# Patient Record
Sex: FEMALE | Race: WHITE | Employment: OTHER | URBAN - METROPOLITAN AREA
[De-identification: names, ages, dates, MRNs, and addresses within clinical notes are randomized per-mention and may not be internally consistent; named-entity substitution may affect disease eponyms.]

---

## 2017-06-13 ENCOUNTER — HOSPITAL ENCOUNTER (OUTPATIENT)
Age: 67
Setting detail: SPECIMEN
Discharge: HOME | End: 2017-06-13
Payer: MEDICARE

## 2017-06-13 LAB
ALBUMIN SERPL-MCNC: 4.1 G/DL (ref 3.5–5.2)
ALBUMIN/GLOBULIN RATIO: 1.1 (ref 1–2.5)
ALP BLD-CCNC: 42 U/L (ref 35–104)
ALT SERPL-CCNC: 78 U/L (ref 5–33)
ANION GAP SERPL CALCULATED.3IONS-SCNC: 16 MMOL/L (ref 9–17)
AST SERPL-CCNC: 60 U/L
BILIRUB SERPL-MCNC: 0.43 MG/DL (ref 0.3–1.2)
BUN BLDV-MCNC: 14 MG/DL (ref 8–23)
BUN/CREAT BLD: ABNORMAL (ref 9–20)
CALCIUM SERPL-MCNC: 9.5 MG/DL (ref 8.6–10.4)
CHLORIDE BLD-SCNC: 100 MMOL/L (ref 98–107)
CHOLESTEROL/HDL RATIO: 4.1
CHOLESTEROL: 186 MG/DL
CO2: 25 MMOL/L (ref 20–31)
CREAT SERPL-MCNC: 0.63 MG/DL (ref 0.5–0.9)
GFR AFRICAN AMERICAN: >60 ML/MIN
GFR NON-AFRICAN AMERICAN: >60 ML/MIN
GFR SERPL CREATININE-BSD FRML MDRD: ABNORMAL ML/MIN/{1.73_M2}
GFR SERPL CREATININE-BSD FRML MDRD: ABNORMAL ML/MIN/{1.73_M2}
GLUCOSE BLD-MCNC: 100 MG/DL (ref 70–99)
HCT VFR BLD CALC: 40.9 % (ref 36–46)
HDLC SERPL-MCNC: 45 MG/DL
HEMOGLOBIN: 13.6 G/DL (ref 12–16)
LDL CHOLESTEROL: 112 MG/DL (ref 0–130)
MCH RBC QN AUTO: 30.6 PG (ref 26–34)
MCHC RBC AUTO-ENTMCNC: 33.2 G/DL (ref 31–37)
MCV RBC AUTO: 92.1 FL (ref 80–100)
PDW BLD-RTO: 14.3 % (ref 12.5–15.4)
PLATELET # BLD: 217 K/UL (ref 140–450)
PMV BLD AUTO: 10 FL (ref 6–12)
POTASSIUM SERPL-SCNC: 4.8 MMOL/L (ref 3.7–5.3)
RBC # BLD: 4.44 M/UL (ref 4–5.2)
SODIUM BLD-SCNC: 141 MMOL/L (ref 135–144)
TOTAL PROTEIN: 7.9 G/DL (ref 6.4–8.3)
TRIGL SERPL-MCNC: 146 MG/DL
TSH SERPL DL<=0.05 MIU/L-ACNC: 1.53 MIU/L (ref 0.3–5)
VLDLC SERPL CALC-MCNC: NORMAL MG/DL (ref 1–30)
WBC # BLD: 5.1 K/UL (ref 3.5–11)

## 2017-08-10 ENCOUNTER — HOSPITAL ENCOUNTER (OUTPATIENT)
Age: 67
Setting detail: SPECIMEN
Discharge: HOME | End: 2017-08-10
Payer: MEDICARE

## 2017-08-10 LAB
ALBUMIN SERPL-MCNC: 4.2 G/DL (ref 3.5–5.2)
ALBUMIN/GLOBULIN RATIO: 1.1 (ref 1–2.5)
ALP BLD-CCNC: 44 U/L (ref 35–104)
ALT SERPL-CCNC: 52 U/L (ref 5–33)
AST SERPL-CCNC: 41 U/L
BILIRUB SERPL-MCNC: 0.4 MG/DL (ref 0.3–1.2)
BILIRUBIN DIRECT: 0.1 MG/DL
BILIRUBIN, INDIRECT: 0.3 MG/DL (ref 0–1)
GLOBULIN: ABNORMAL G/DL (ref 1.5–3.8)
TOTAL PROTEIN: 7.9 G/DL (ref 6.4–8.3)

## 2018-01-16 ENCOUNTER — HOSPITAL ENCOUNTER (OUTPATIENT)
Age: 68
Setting detail: SPECIMEN
Discharge: HOME | End: 2018-01-16
Payer: MEDICARE

## 2018-01-16 LAB
ALT SERPL-CCNC: 86 U/L (ref 5–33)
AST SERPL-CCNC: 64 U/L
CHOLESTEROL/HDL RATIO: 4.1
CHOLESTEROL: 156 MG/DL
HDLC SERPL-MCNC: 38 MG/DL
LDL CHOLESTEROL: 85 MG/DL (ref 0–130)
TRIGL SERPL-MCNC: 163 MG/DL
VLDLC SERPL CALC-MCNC: ABNORMAL MG/DL (ref 1–30)

## 2018-08-14 ENCOUNTER — HOSPITAL ENCOUNTER (OUTPATIENT)
Age: 68
Setting detail: SPECIMEN
Discharge: HOME | End: 2018-08-14
Payer: MEDICARE

## 2018-08-14 LAB
ALT SERPL-CCNC: 65 U/L (ref 5–33)
AST SERPL-CCNC: 44 U/L
BUN BLDV-MCNC: 11 MG/DL (ref 8–23)
CHOLESTEROL/HDL RATIO: 4.2
CHOLESTEROL: 187 MG/DL
CREAT SERPL-MCNC: 0.63 MG/DL (ref 0.5–0.9)
GFR AFRICAN AMERICAN: >60 ML/MIN
GFR NON-AFRICAN AMERICAN: >60 ML/MIN
GFR SERPL CREATININE-BSD FRML MDRD: NORMAL ML/MIN/{1.73_M2}
GFR SERPL CREATININE-BSD FRML MDRD: NORMAL ML/MIN/{1.73_M2}
HDLC SERPL-MCNC: 45 MG/DL
LDL CHOLESTEROL: 108 MG/DL (ref 0–130)
TRIGL SERPL-MCNC: 172 MG/DL
VLDLC SERPL CALC-MCNC: ABNORMAL MG/DL (ref 1–30)

## 2019-02-18 ENCOUNTER — HOSPITAL ENCOUNTER (OUTPATIENT)
Age: 69
Setting detail: SPECIMEN
Discharge: HOME | End: 2019-02-18
Payer: MEDICARE

## 2019-02-18 LAB
ALBUMIN SERPL-MCNC: 4 G/DL (ref 3.5–5.2)
ALBUMIN/GLOBULIN RATIO: 1.1 (ref 1–2.5)
ALP BLD-CCNC: 43 U/L (ref 35–104)
ALT SERPL-CCNC: 62 U/L (ref 5–33)
ANION GAP SERPL CALCULATED.3IONS-SCNC: 13 MMOL/L (ref 9–17)
AST SERPL-CCNC: 55 U/L
BILIRUB SERPL-MCNC: 0.45 MG/DL (ref 0.3–1.2)
BUN BLDV-MCNC: 11 MG/DL (ref 8–23)
BUN/CREAT BLD: ABNORMAL (ref 9–20)
CALCIUM SERPL-MCNC: 9.4 MG/DL (ref 8.6–10.4)
CHLORIDE BLD-SCNC: 102 MMOL/L (ref 98–107)
CHOLESTEROL/HDL RATIO: 4.8
CHOLESTEROL: 183 MG/DL
CO2: 27 MMOL/L (ref 20–31)
CREAT SERPL-MCNC: 0.56 MG/DL (ref 0.5–0.9)
GFR AFRICAN AMERICAN: >60 ML/MIN
GFR NON-AFRICAN AMERICAN: >60 ML/MIN
GFR SERPL CREATININE-BSD FRML MDRD: ABNORMAL ML/MIN/{1.73_M2}
GFR SERPL CREATININE-BSD FRML MDRD: ABNORMAL ML/MIN/{1.73_M2}
GLUCOSE BLD-MCNC: 95 MG/DL (ref 70–99)
HCT VFR BLD CALC: 41.7 % (ref 36.3–47.1)
HDLC SERPL-MCNC: 38 MG/DL
HEMOGLOBIN: 14.2 G/DL (ref 11.9–15.1)
LDL CHOLESTEROL: 109 MG/DL (ref 0–130)
MCH RBC QN AUTO: 32.3 PG (ref 25.2–33.5)
MCHC RBC AUTO-ENTMCNC: 34.1 G/DL (ref 28.4–34.8)
MCV RBC AUTO: 95 FL (ref 82.6–102.9)
NRBC AUTOMATED: 0 PER 100 WBC
PDW BLD-RTO: 12.2 % (ref 11.8–14.4)
PLATELET # BLD: 220 K/UL (ref 138–453)
PMV BLD AUTO: 11.4 FL (ref 8.1–13.5)
POTASSIUM SERPL-SCNC: 4.2 MMOL/L (ref 3.7–5.3)
RBC # BLD: 4.39 M/UL (ref 3.95–5.11)
SODIUM BLD-SCNC: 142 MMOL/L (ref 135–144)
TOTAL PROTEIN: 7.8 G/DL (ref 6.4–8.3)
TRIGL SERPL-MCNC: 182 MG/DL
TSH SERPL DL<=0.05 MIU/L-ACNC: 2.1 MIU/L (ref 0.3–5)
VLDLC SERPL CALC-MCNC: ABNORMAL MG/DL (ref 1–30)
WBC # BLD: 4.8 K/UL (ref 3.5–11.3)

## 2019-05-08 ENCOUNTER — HOSPITAL ENCOUNTER (OUTPATIENT)
Age: 69
Discharge: HOME | End: 2019-05-08
Payer: MEDICARE

## 2019-05-08 LAB
ALT SERPL-CCNC: 61 U/L (ref 5–33)
AST SERPL-CCNC: 54 U/L

## 2019-05-08 PROCEDURE — 84460 ALANINE AMINO (ALT) (SGPT): CPT

## 2019-05-08 PROCEDURE — 36415 COLL VENOUS BLD VENIPUNCTURE: CPT

## 2019-05-08 PROCEDURE — 84450 TRANSFERASE (AST) (SGOT): CPT

## 2019-09-23 ENCOUNTER — HOSPITAL ENCOUNTER (OUTPATIENT)
Age: 69
Setting detail: SPECIMEN
Discharge: HOME | End: 2019-09-23
Payer: MEDICARE

## 2019-09-23 LAB
ABSOLUTE EOS #: 0.14 K/UL (ref 0–0.44)
ABSOLUTE IMMATURE GRANULOCYTE: <0.03 K/UL (ref 0–0.3)
ABSOLUTE LYMPH #: 1.54 K/UL (ref 1.1–3.7)
ABSOLUTE MONO #: 0.31 K/UL (ref 0.1–1.2)
ALBUMIN SERPL-MCNC: 3.9 G/DL (ref 3.5–5.2)
ALBUMIN/GLOBULIN RATIO: 1 (ref 1–2.5)
ALP BLD-CCNC: 34 U/L (ref 35–104)
ALT SERPL-CCNC: 38 U/L (ref 5–33)
ANION GAP SERPL CALCULATED.3IONS-SCNC: 13 MMOL/L (ref 9–17)
AST SERPL-CCNC: 32 U/L
BASOPHILS # BLD: 1 % (ref 0–2)
BASOPHILS ABSOLUTE: 0.03 K/UL (ref 0–0.2)
BILIRUB SERPL-MCNC: 0.46 MG/DL (ref 0.3–1.2)
BUN BLDV-MCNC: 12 MG/DL (ref 8–23)
BUN/CREAT BLD: ABNORMAL (ref 9–20)
CALCIUM SERPL-MCNC: 9.1 MG/DL (ref 8.6–10.4)
CHLORIDE BLD-SCNC: 104 MMOL/L (ref 98–107)
CHOLESTEROL/HDL RATIO: 5.1
CHOLESTEROL: 203 MG/DL
CO2: 26 MMOL/L (ref 20–31)
CREAT SERPL-MCNC: 0.53 MG/DL (ref 0.5–0.9)
DIFFERENTIAL TYPE: NORMAL
EOSINOPHILS RELATIVE PERCENT: 3 % (ref 1–4)
GFR AFRICAN AMERICAN: >60 ML/MIN
GFR NON-AFRICAN AMERICAN: >60 ML/MIN
GFR SERPL CREATININE-BSD FRML MDRD: ABNORMAL ML/MIN/{1.73_M2}
GFR SERPL CREATININE-BSD FRML MDRD: ABNORMAL ML/MIN/{1.73_M2}
GLUCOSE BLD-MCNC: 109 MG/DL (ref 70–99)
HCT VFR BLD CALC: 41.7 % (ref 36.3–47.1)
HDLC SERPL-MCNC: 40 MG/DL
HEMOGLOBIN: 13 G/DL (ref 11.9–15.1)
IMMATURE GRANULOCYTES: 0 %
LDL CHOLESTEROL: 127 MG/DL (ref 0–130)
LYMPHOCYTES # BLD: 34 % (ref 24–43)
MCH RBC QN AUTO: 29.7 PG (ref 25.2–33.5)
MCHC RBC AUTO-ENTMCNC: 31.2 G/DL (ref 28.4–34.8)
MCV RBC AUTO: 95.4 FL (ref 82.6–102.9)
MONOCYTES # BLD: 7 % (ref 3–12)
NRBC AUTOMATED: 0 PER 100 WBC
PDW BLD-RTO: 12.2 % (ref 11.8–14.4)
PLATELET # BLD: 202 K/UL (ref 138–453)
PLATELET ESTIMATE: NORMAL
PMV BLD AUTO: 11.4 FL (ref 8.1–13.5)
POTASSIUM SERPL-SCNC: 4.2 MMOL/L (ref 3.7–5.3)
RBC # BLD: 4.37 M/UL (ref 3.95–5.11)
RBC # BLD: NORMAL 10*6/UL
SEG NEUTROPHILS: 55 % (ref 36–65)
SEGMENTED NEUTROPHILS ABSOLUTE COUNT: 2.48 K/UL (ref 1.5–8.1)
SODIUM BLD-SCNC: 143 MMOL/L (ref 135–144)
THYROXINE, FREE: 0.85 NG/DL (ref 0.93–1.7)
TOTAL PROTEIN: 7.7 G/DL (ref 6.4–8.3)
TRIGL SERPL-MCNC: 178 MG/DL
TSH SERPL DL<=0.05 MIU/L-ACNC: 1.37 MIU/L (ref 0.3–5)
VITAMIN D 25-HYDROXY: 39.1 NG/ML (ref 30–100)
VLDLC SERPL CALC-MCNC: ABNORMAL MG/DL (ref 1–30)
WBC # BLD: 4.5 K/UL (ref 3.5–11.3)
WBC # BLD: NORMAL 10*3/UL

## 2019-09-23 PROCEDURE — 36415 COLL VENOUS BLD VENIPUNCTURE: CPT

## 2019-09-23 PROCEDURE — 84439 ASSAY OF FREE THYROXINE: CPT

## 2019-09-23 PROCEDURE — 80053 COMPREHEN METABOLIC PANEL: CPT

## 2019-09-23 PROCEDURE — 84443 ASSAY THYROID STIM HORMONE: CPT

## 2019-09-23 PROCEDURE — 82306 VITAMIN D 25 HYDROXY: CPT

## 2019-09-23 PROCEDURE — 85025 COMPLETE CBC W/AUTO DIFF WBC: CPT

## 2019-09-23 PROCEDURE — 80061 LIPID PANEL: CPT

## 2020-06-12 ENCOUNTER — HOSPITAL ENCOUNTER (OUTPATIENT)
Age: 70
Setting detail: SPECIMEN
Discharge: HOME | End: 2020-06-12
Payer: MEDICARE

## 2020-06-12 LAB
ALBUMIN SERPL-MCNC: 4 G/DL (ref 3.5–5.2)
ALBUMIN/GLOBULIN RATIO: 1.1 (ref 1–2.5)
ALP BLD-CCNC: 38 U/L (ref 35–104)
ALT SERPL-CCNC: 28 U/L (ref 5–33)
ANION GAP SERPL CALCULATED.3IONS-SCNC: 12 MMOL/L (ref 9–17)
AST SERPL-CCNC: 24 U/L
BILIRUB SERPL-MCNC: 0.53 MG/DL (ref 0.3–1.2)
BUN BLDV-MCNC: 17 MG/DL (ref 8–23)
BUN/CREAT BLD: NORMAL (ref 9–20)
CALCIUM SERPL-MCNC: 9 MG/DL (ref 8.6–10.4)
CHLORIDE BLD-SCNC: 102 MMOL/L (ref 98–107)
CHOLESTEROL, FASTING: 172 MG/DL
CHOLESTEROL/HDL RATIO: 4.5
CO2: 27 MMOL/L (ref 20–31)
CREAT SERPL-MCNC: 0.54 MG/DL (ref 0.5–0.9)
GFR AFRICAN AMERICAN: >60 ML/MIN
GFR NON-AFRICAN AMERICAN: >60 ML/MIN
GFR SERPL CREATININE-BSD FRML MDRD: NORMAL ML/MIN/{1.73_M2}
GFR SERPL CREATININE-BSD FRML MDRD: NORMAL ML/MIN/{1.73_M2}
GLUCOSE FASTING: 99 MG/DL (ref 70–99)
HDLC SERPL-MCNC: 38 MG/DL
LDL CHOLESTEROL: 97 MG/DL (ref 0–130)
POTASSIUM SERPL-SCNC: 4.6 MMOL/L (ref 3.7–5.3)
SODIUM BLD-SCNC: 141 MMOL/L (ref 135–144)
TOTAL PROTEIN: 7.6 G/DL (ref 6.4–8.3)
TRIGLYCERIDE, FASTING: 187 MG/DL
VLDLC SERPL CALC-MCNC: ABNORMAL MG/DL (ref 1–30)

## 2020-06-12 PROCEDURE — 80061 LIPID PANEL: CPT

## 2020-06-12 PROCEDURE — 80053 COMPREHEN METABOLIC PANEL: CPT

## 2023-02-14 PROBLEM — E66.9 OBESITY WITH BODY MASS INDEX 30 OR GREATER: Status: ACTIVE | Noted: 2023-02-14

## 2025-06-03 ENCOUNTER — TELEPHONE (OUTPATIENT)
Dept: SURGERY | Age: 75
End: 2025-06-03

## 2025-06-03 NOTE — TELEPHONE ENCOUNTER
Received call from patient requesting to schedule colonoscopy w/ Dr. Gonzalez. Previous [facility] pt - no concerns at this time. Stated last colonoscopy was about 5 years ago --- stated she is on the 5 year plan.    Demographics & insurance info updated.    Please advise.    Call back #: [phone]

## 2025-06-03 NOTE — TELEPHONE ENCOUNTER
[facility] General Surgery  Screening colonoscopy questionnaire  Anagini Carrera    Pt Name: Lacie Gill  MRN: [id number]  YOB: 1950  Primary Care Physician: Jw Flores, DO      Have you ever had a colonoscopy? yes  When was your last colonoscopy? 5/2016 and 6/2020  with dr eden  Have you ever had polyps or abnormal findings on past colonoscopies polyp noted on 2016 colo  Which bowel prep did you use? Lime flavored prep Were there any side effects from it? no    Have you ever had a stool test to check for colon cancer? No  Was it positive?       Do you have personal history of colon cancer? No    Do you have any family history of colon cancer? Yes  If yes, which family member had colon cancer? Maternal gma   Were they diagnosed younger or older than the age of 60?  unsure    Do you have a history of Crohn's disease or Ulcerative Colitis? No    Do you have a history of constipation?  Bouts of diarrhea    Do you have a history of bloody or black stools? No    Have you ever had surgery done inside your abdomen? No  What surgery?    8. Are you taking any blood thinners? No   If yes, what is the name of the blood thinner?     9. Are you taking any GLP-1 medications or Diabetic meds ?Yes   If yes, what is the name of the GLP-1?  metformin      Scheduling:    Okay to schedule colonoscopy.  Reason for colonoscopy: Personal history of colon polyps.

## 2025-06-16 ENCOUNTER — PREP FOR PROCEDURE (OUTPATIENT)
Dept: SURGERY | Age: 75
End: 2025-06-16

## 2025-06-16 ENCOUNTER — TELEPHONE (OUTPATIENT)
Dept: SURGERY | Age: 75
End: 2025-06-16

## 2025-06-16 DIAGNOSIS — Z86.0100 HX OF COLONIC POLYPS: ICD-10-CM

## 2025-06-16 DIAGNOSIS — Z86.0100 HX OF COLONIC POLYPS: Primary | ICD-10-CM

## 2025-06-16 RX ORDER — SOD SULF/POT CHLORIDE/MAG SULF 1.479 G
TABLET ORAL
Qty: 24 TABLET | Refills: 0 | Status: SHIPPED | OUTPATIENT
Start: 2025-06-16

## 2025-06-23 ENCOUNTER — TELEPHONE (OUTPATIENT)
Dept: SURGERY | Age: 75
End: 2025-06-23

## 2025-06-23 NOTE — TELEPHONE ENCOUNTER
6/23/2025 Left voicemail for patient to call the office and go over the instructions below.  06/23/2025 Spoke with patient.    Procedure confirmation instructions    - Arrival time: 7:15 am  - Scheduled time: 8:45 am  - Location: [facility], Entrance C check in with registration right when you walk in.    - Do you have a  that will stay at the hospital, drive you home and will be with you after the procedure? Yes  - Any illness is the last 7 days: No  -call the office ASAP if you are ill prior to your colonoscopy    - Blood Thinners: NA                Name: NA                 Last scheduled dose: NA  - GLP-1/Weight loss: No    Name: N/A   Last scheduled dose: NA   Prep for GLP-1: N/A    - Prep:  Sutab            - Take the prep as directed by the office not on what the label says    - Clear liquid diet the day prior to the appointment- No solid food, dairy, no corn, nothing red, nothing purple.  - Nothing by mouth after midnight, including smoking and vaping.  - The morning of the procedure only take your blood pressure, heart and seizure medication with a small sip of water only.  - No NSAID, ASA, vitamin or herbal supplements 7 days prior.  - Come in comfortable clothes, no make up, nail polish, lotions, leave contacts at home or bring in the case, no jewelry  - Use antibacterial soap (dial soap or Hibiclens) the night before or morning of.      Patient confirms they have picked up their bowel prep from the pharmacy.  Patient confirms they have their instructions to reference.   Patient voiced understanding all the above.

## 2025-06-30 ENCOUNTER — ANESTHESIA EVENT (OUTPATIENT)
Dept: OPERATING ROOM | Age: 75
End: 2025-06-30
Payer: MEDICARE

## 2025-06-30 PROCEDURE — NBSRV NON-BILLABLE SERVICE: Performed by: COLON & RECTAL SURGERY

## 2025-06-30 ASSESSMENT — ENCOUNTER SYMPTOMS
ANAL BLEEDING: 0
CHEST TIGHTNESS: 0
DIARRHEA: 0
CONSTIPATION: 0
COLOR CHANGE: 0
WHEEZING: 0
SHORTNESS OF BREATH: 0
BACK PAIN: 0
ABDOMINAL PAIN: 0
VOMITING: 0
ABDOMINAL DISTENTION: 0
RECTAL PAIN: 0
NAUSEA: 0
STRIDOR: 0
BLOOD IN STOOL: 0
APNEA: 0
COUGH: 0

## 2025-06-30 NOTE — H&P
[facility] [place]  [address]  [facility] [address]  Dept: [phone]  Loc: [phone]    Patient:  Lacie Gill  YOB: 1950  Date: 6/30/2025     The patient is a 75 y.o. female who presents today for consult of the following problems:     Chief Complaint: Colonoscopy needed for polyp surveillance.    HPI:   This 75-year-old lady presents with a need for colonoscopy for polyp surveillance.  Her last colonoscopy was in 2020.  Patient had a polyp removed in 2016.  Currently the patient denies any GI related symptoms.  History:     Past Medical History:   Diagnosis Date    Depression     Hypertension     Shortness of breath      Past Surgical History:   Procedure Laterality Date    FRACTURE SURGERY  2014    HYSTERECTOMY (CERVIX STATUS UNKNOWN)       No family history on file.  Social History     Tobacco Use    Smoking status: Never   Substance Use Topics    Alcohol use: Yes     Alcohol/week: 0.0 standard drinks of alcohol     Comment: 1 drink per week    Drug use: No     No current facility-administered medications for this encounter.     Current Outpatient Medications   Medication Sig Dispense Refill    Sodium Sulfate-Mag Sulfate-KCl (SUTAB) 0749-484-185 MG TABS Please take as directed by office for colonoscopy 24 tablet 0    fluorouracil (EFUDEX) 5 % cream Apply topically 2 times daily. 40 g 0    calcium carbonate (OSCAL) 500 MG TABS tablet Take 500 mg by mouth daily      aspirin 81 MG tablet Take 81 mg by mouth daily      escitalopram (LEXAPRO) 10 MG tablet Take 10 mg by mouth daily      lisinopril (PRINIVIL;ZESTRIL) 40 MG tablet Take 40 mg by mouth daily       Prior to Admission medications    Medication Sig Start Date End Date Taking? Authorizing Provider   Sodium Sulfate-Mag Sulfate-KCl (SUTAB) 0960-738-008 MG TABS Please take as directed by office for colonoscopy 6/16/25   Romulo Gonzalez MD   fluorouracil (EFUDEX) 5 % cream Apply topically 2

## 2025-07-01 ENCOUNTER — HOSPITAL ENCOUNTER (OUTPATIENT)
Age: 75
Setting detail: OUTPATIENT SURGERY
Discharge: HOME | End: 2025-07-01
Attending: COLON & RECTAL SURGERY | Admitting: COLON & RECTAL SURGERY
Payer: MEDICARE

## 2025-07-01 ENCOUNTER — TELEPHONE (OUTPATIENT)
Dept: SURGERY | Age: 75
End: 2025-07-01

## 2025-07-01 ENCOUNTER — TELEPHONE (OUTPATIENT)
Age: 75
End: 2025-07-01

## 2025-07-01 ENCOUNTER — ANESTHESIA (OUTPATIENT)
Dept: OPERATING ROOM | Age: 75
End: 2025-07-01
Payer: MEDICARE

## 2025-07-01 VITALS
TEMPERATURE: 97.5 F | RESPIRATION RATE: 14 BRPM | BODY MASS INDEX: 34.73 KG/M2 | WEIGHT: 196 LBS | HEART RATE: 51 BPM | DIASTOLIC BLOOD PRESSURE: 71 MMHG | OXYGEN SATURATION: 97 % | SYSTOLIC BLOOD PRESSURE: 126 MMHG | HEIGHT: 63 IN

## 2025-07-01 DIAGNOSIS — Z86.0100 HX OF COLONIC POLYPS: Primary | ICD-10-CM

## 2025-07-01 DIAGNOSIS — Z86.0100 HX OF COLONIC POLYPS: ICD-10-CM

## 2025-07-01 PROCEDURE — 7100000011 HC PHASE II RECOVERY - ADDTL 15 MIN: Performed by: COLON & RECTAL SURGERY

## 2025-07-01 PROCEDURE — 45385 COLONOSCOPY W/LESION REMOVAL: CPT | Performed by: COLON & RECTAL SURGERY

## 2025-07-01 PROCEDURE — 3609010600 HC COLONOSCOPY POLYPECTOMY SNARE/COLD BIOPSY: Performed by: COLON & RECTAL SURGERY

## 2025-07-01 PROCEDURE — 2709999900 HC NON-CHARGEABLE SUPPLY: Performed by: COLON & RECTAL SURGERY

## 2025-07-01 PROCEDURE — 7100000010 HC PHASE II RECOVERY - FIRST 15 MIN: Performed by: COLON & RECTAL SURGERY

## 2025-07-01 PROCEDURE — 2580000003 HC RX 258: Performed by: STUDENT IN AN ORGANIZED HEALTH CARE EDUCATION/TRAINING PROGRAM

## 2025-07-01 PROCEDURE — 3700000001 HC ADD 15 MINUTES (ANESTHESIA): Performed by: COLON & RECTAL SURGERY

## 2025-07-01 PROCEDURE — 3700000000 HC ANESTHESIA ATTENDED CARE: Performed by: COLON & RECTAL SURGERY

## 2025-07-01 PROCEDURE — 45380 COLONOSCOPY AND BIOPSY: CPT | Performed by: COLON & RECTAL SURGERY

## 2025-07-01 PROCEDURE — 6360000002 HC RX W HCPCS: Performed by: NURSE ANESTHETIST, CERTIFIED REGISTERED

## 2025-07-01 PROCEDURE — 88305 TISSUE EXAM BY PATHOLOGIST: CPT

## 2025-07-01 RX ORDER — IPRATROPIUM BROMIDE AND ALBUTEROL SULFATE 2.5; .5 MG/3ML; MG/3ML
1 SOLUTION RESPIRATORY (INHALATION)
Status: DISCONTINUED | OUTPATIENT
Start: 2025-07-01 | End: 2025-07-01 | Stop reason: HOSPADM

## 2025-07-01 RX ORDER — LIDOCAINE HYDROCHLORIDE 10 MG/ML
1 INJECTION, SOLUTION EPIDURAL; INFILTRATION; INTRACAUDAL; PERINEURAL
Status: DISCONTINUED | OUTPATIENT
Start: 2025-07-01 | End: 2025-07-01 | Stop reason: HOSPADM

## 2025-07-01 RX ORDER — OXYCODONE AND ACETAMINOPHEN 5; 325 MG/1; MG/1
2 TABLET ORAL
Status: DISCONTINUED | OUTPATIENT
Start: 2025-07-01 | End: 2025-07-01 | Stop reason: HOSPADM

## 2025-07-01 RX ORDER — SODIUM CHLORIDE 0.9 % (FLUSH) 0.9 %
5-40 SYRINGE (ML) INJECTION PRN
Status: DISCONTINUED | OUTPATIENT
Start: 2025-07-01 | End: 2025-07-01 | Stop reason: HOSPADM

## 2025-07-01 RX ORDER — PHENOL 1.4 %
1 AEROSOL, SPRAY (ML) MUCOUS MEMBRANE DAILY
COMMUNITY

## 2025-07-01 RX ORDER — LABETALOL HYDROCHLORIDE 5 MG/ML
10 INJECTION, SOLUTION INTRAVENOUS
Status: DISCONTINUED | OUTPATIENT
Start: 2025-07-01 | End: 2025-07-01 | Stop reason: HOSPADM

## 2025-07-01 RX ORDER — NALOXONE HYDROCHLORIDE 0.4 MG/ML
INJECTION, SOLUTION INTRAMUSCULAR; INTRAVENOUS; SUBCUTANEOUS PRN
Status: DISCONTINUED | OUTPATIENT
Start: 2025-07-01 | End: 2025-07-01 | Stop reason: HOSPADM

## 2025-07-01 RX ORDER — ROSUVASTATIN CALCIUM 5 MG/1
5 TABLET, COATED ORAL DAILY
COMMUNITY

## 2025-07-01 RX ORDER — LIDOCAINE HYDROCHLORIDE 10 MG/ML
INJECTION, SOLUTION EPIDURAL; INFILTRATION; INTRACAUDAL; PERINEURAL
Status: DISCONTINUED | OUTPATIENT
Start: 2025-07-01 | End: 2025-07-01 | Stop reason: SDUPTHER

## 2025-07-01 RX ORDER — SODIUM CHLORIDE, SODIUM LACTATE, POTASSIUM CHLORIDE, CALCIUM CHLORIDE 600; 310; 30; 20 MG/100ML; MG/100ML; MG/100ML; MG/100ML
INJECTION, SOLUTION INTRAVENOUS CONTINUOUS
Status: DISCONTINUED | OUTPATIENT
Start: 2025-07-01 | End: 2025-07-01 | Stop reason: HOSPADM

## 2025-07-01 RX ORDER — GLYCOPYRROLATE 0.2 MG/ML
0.4 INJECTION INTRAMUSCULAR; INTRAVENOUS ONCE
Status: DISCONTINUED | OUTPATIENT
Start: 2025-07-01 | End: 2025-07-01 | Stop reason: HOSPADM

## 2025-07-01 RX ORDER — PROPOFOL 10 MG/ML
INJECTION, EMULSION INTRAVENOUS
Status: DISCONTINUED | OUTPATIENT
Start: 2025-07-01 | End: 2025-07-01 | Stop reason: SDUPTHER

## 2025-07-01 RX ORDER — HYDRALAZINE HYDROCHLORIDE 20 MG/ML
10 INJECTION INTRAMUSCULAR; INTRAVENOUS
Status: DISCONTINUED | OUTPATIENT
Start: 2025-07-01 | End: 2025-07-01 | Stop reason: HOSPADM

## 2025-07-01 RX ORDER — SODIUM CHLORIDE 0.9 % (FLUSH) 0.9 %
5-40 SYRINGE (ML) INJECTION EVERY 12 HOURS SCHEDULED
Status: DISCONTINUED | OUTPATIENT
Start: 2025-07-01 | End: 2025-07-01 | Stop reason: HOSPADM

## 2025-07-01 RX ORDER — SODIUM CHLORIDE 9 MG/ML
INJECTION, SOLUTION INTRAVENOUS PRN
Status: DISCONTINUED | OUTPATIENT
Start: 2025-07-01 | End: 2025-07-01 | Stop reason: HOSPADM

## 2025-07-01 RX ORDER — OXYCODONE AND ACETAMINOPHEN 5; 325 MG/1; MG/1
1 TABLET ORAL
Status: DISCONTINUED | OUTPATIENT
Start: 2025-07-01 | End: 2025-07-01 | Stop reason: HOSPADM

## 2025-07-01 RX ORDER — MEPERIDINE HYDROCHLORIDE 50 MG/ML
12.5 INJECTION INTRAMUSCULAR; INTRAVENOUS; SUBCUTANEOUS EVERY 5 MIN PRN
Status: DISCONTINUED | OUTPATIENT
Start: 2025-07-01 | End: 2025-07-01 | Stop reason: HOSPADM

## 2025-07-01 RX ORDER — MORPHINE SULFATE 2 MG/ML
2 INJECTION, SOLUTION INTRAMUSCULAR; INTRAVENOUS EVERY 5 MIN PRN
Status: DISCONTINUED | OUTPATIENT
Start: 2025-07-01 | End: 2025-07-01 | Stop reason: HOSPADM

## 2025-07-01 RX ORDER — METOCLOPRAMIDE HYDROCHLORIDE 5 MG/ML
10 INJECTION INTRAMUSCULAR; INTRAVENOUS
Status: DISCONTINUED | OUTPATIENT
Start: 2025-07-01 | End: 2025-07-01 | Stop reason: HOSPADM

## 2025-07-01 RX ORDER — MIDAZOLAM HYDROCHLORIDE 2 MG/2ML
2 INJECTION, SOLUTION INTRAMUSCULAR; INTRAVENOUS
Status: DISCONTINUED | OUTPATIENT
Start: 2025-07-01 | End: 2025-07-01 | Stop reason: HOSPADM

## 2025-07-01 RX ORDER — ONDANSETRON 2 MG/ML
4 INJECTION INTRAMUSCULAR; INTRAVENOUS
Status: DISCONTINUED | OUTPATIENT
Start: 2025-07-01 | End: 2025-07-01 | Stop reason: HOSPADM

## 2025-07-01 RX ORDER — DIPHENHYDRAMINE HYDROCHLORIDE 50 MG/ML
12.5 INJECTION, SOLUTION INTRAMUSCULAR; INTRAVENOUS
Status: DISCONTINUED | OUTPATIENT
Start: 2025-07-01 | End: 2025-07-01 | Stop reason: HOSPADM

## 2025-07-01 RX ORDER — IRBESARTAN 300 MG/1
300 TABLET ORAL
COMMUNITY

## 2025-07-01 RX ADMIN — PROPOFOL 50 MG: 10 INJECTION, EMULSION INTRAVENOUS at 08:46

## 2025-07-01 RX ADMIN — PROPOFOL 140 MCG/KG/MIN: 10 INJECTION, EMULSION INTRAVENOUS at 08:46

## 2025-07-01 RX ADMIN — SODIUM CHLORIDE, POTASSIUM CHLORIDE, SODIUM LACTATE AND CALCIUM CHLORIDE: 600; 310; 30; 20 INJECTION, SOLUTION INTRAVENOUS at 08:39

## 2025-07-01 RX ADMIN — LIDOCAINE HYDROCHLORIDE 50 MG: 10 INJECTION, SOLUTION EPIDURAL; INFILTRATION; INTRACAUDAL; PERINEURAL at 08:46

## 2025-07-01 ASSESSMENT — PAIN - FUNCTIONAL ASSESSMENT: PAIN_FUNCTIONAL_ASSESSMENT: NONE - DENIES PAIN

## 2025-07-01 NOTE — ANESTHESIA PRE PROCEDURE
Department of Anesthesiology  Preprocedure Note       Name:  Lacie Gill   Age:  75 y.o.  :  1950                                          MRN:  [id number]         Date:  2025      Surgeon: Surgeon(s):  Romulo Gonzalez MD    Procedure: Procedure(s):  COLONOSCOPY DIAGNOSTIC    Medications prior to admission:   Prior to Admission medications    Medication Sig Start Date End Date Taking? Authorizing Provider   irbesartan (AVAPRO) 300 MG tablet Take 1 tablet by mouth every morning (before breakfast)   Yes Bo Fay MD   calcium carbonate 600 MG TABS tablet Take 1 tablet by mouth daily   Yes Bo Fay MD   metFORMIN (GLUCOPHAGE) 500 MG tablet Take 1 tablet by mouth 2 times daily (with meals)   Yes Bo Fay MD   rosuvastatin (CRESTOR) 5 MG tablet Take 1 tablet by mouth daily   Yes Bo Fay MD   Sodium Sulfate-Mag Sulfate-KCl (SUTAB) 4738-024-987 MG TABS Please take as directed by office for colonoscopy 25  Yes Romulo Gonzalez MD   escitalopram (LEXAPRO) 10 MG tablet Take 1 tablet by mouth daily   Yes Bo Fay MD   fluorouracil (EFUDEX) 5 % cream Apply topically 2 times daily.  Patient not taking: Reported on 2025   Bernadine Gates, CORNELL - CNP   calcium carbonate (OSCAL) 500 MG TABS tablet Take 500 mg by mouth daily  Patient not taking: Reported on 2025    Bo Fay MD   aspirin 81 MG tablet Take 81 mg by mouth daily  Patient not taking: Reported on 2025    Bo Fay MD   lisinopril (PRINIVIL;ZESTRIL) 40 MG tablet Take 40 mg by mouth daily  Patient not taking: Reported on 2025    Bo Fay MD       Current medications:    Current Facility-Administered Medications   Medication Dose Route Frequency Provider Last Rate Last Admin   • lidocaine PF 1 % injection 1 mL  1 mL IntraDERmal Once PRN Corrie Herbert MD       • lactated ringers infusion   IntraVENous Continuous Corrie Herbert

## 2025-07-01 NOTE — DISCHARGE INSTRUCTIONS
Colonoscopy / Sigmoidoscopy Discharge Instructions  Romulo Gonzalez M.D.    You may have a regular diet unless instructed otherwise and if you do not have any abdominal (belly) pains, nausea, vomiting or fever.    No driving, operating machinery or making important decisions for the next 24 hours.    It is normal to feel distended or full in the abdomen; passing gas will help relieve the fullness.  The fullness is from the air put inside your bowel (colon) during the procedure.    It is normal to pass small amounts of blood after the colonoscopy.    Findings today included:  Small polyp completely removed and sent to pathology  Fatty tissue which was biopsied.  Large broad-based polyp which was left in place as removal with standard colonoscopy techniques with the wrist bowel perforation.  Referral will be made to Dr. Buckner whose office will call you and arrangements will be made to remove it by special technique known as Endo mucosal resection.    Medications:   Changes to home medication therapy: See below  Hold Aspirin and related pain meds (Ibuprofen, Naproxen, etc) except Acetaminophen  for 7 days.    We will call you within a week with results of pathology tests if any tissue was removed.    Schedule a follow up visit as needed.    Below are abnormal symptoms which will need medical attention; please call the office at any time if these occur.  Large amount of bleeding through the rectum   Severe abdominal pain or pain in the belly (it is normal to have occasional mild discomfort).  Fever of 101F or above, chills or excessive sweating.  Persistent nausea or vomiting.

## 2025-07-01 NOTE — TELEPHONE ENCOUNTER
Per Staff order after 07/01 colonoscopy:    Romulo Gonzalez MD Vargyas, Chelsea, ROOPA; Ricardo Sandoval MA  Please refer this patient to Dr. Buckner for removal of broad-based polyp at 30 cm by EMR technique.  Patient should be on media.  Thank you.  Lisa.

## 2025-07-01 NOTE — ANESTHESIA POSTPROCEDURE EVALUATION
Department of Anesthesiology  Postprocedure Note    Patient: Lacie Gill  MRN: [id number]  YOB: 1950  Date of evaluation: 7/1/2025    Procedure Summary       Date: 07/01/25 Room / Location: [facility] PROCEDURE ROOM / [facility]    Anesthesia Start: 0842 Anesthesia Stop: 0923    Procedure: COLONOSCOPY POLYPECTOMY COLD SNARE AND LIPOMA BIOPSY (Anus) Diagnosis:       Hx of colonic polyps      (Hx of colonic polyps [Z86.0100])    Surgeons: Romulo Gonzalez MD Responsible Provider: Joseph Keller MD    Anesthesia Type: MAC ASA Status: 2            Anesthesia Type: No value filed.    Domi Phase I: Domi Score: 10    Domi Phase II: Domi Score: 10    Anesthesia Post Evaluation    Patient location during evaluation: PACU  Patient participation: complete - patient participated  Level of consciousness: awake and alert  Airway patency: patent  Nausea & Vomiting: no nausea and no vomiting  Cardiovascular status: hemodynamically stable  Respiratory status: room air and spontaneous ventilation  Hydration status: euvolemic  Multimodal analgesia pain management approach  Pain management: adequate    No notable events documented.

## 2025-07-01 NOTE — OP NOTE
Colonoscopy Operative Report    Pre-operative Diagnosis: Personal history of colon/ rectal polyp    Post-operative Diagnosis: Colonic polyps, and lipoma.    Procedure: Colonoscopy with snare polypectomy and Colonoscopy with biopsy    Surgeon: BERNARDO Gonzalez  Assistant: RN/ Technician    IV Medications: Monitored anesthesia administered by anesthesiologist    Complications: None    Estimated blood loss: None    Bowel Prep Quality:    []  Good    []  Some fecal material but acceptable    []  Unacceptable      Findings:       [x]  Polyps    #1,  8 mm in size, sessile, located at 100 cm from anal verge removed by cold snare and retrieved for pathology  #2,  50 mm in size, sessile on a broad pedicle located at 30 cm from anal verge.  This was left in place due to the risk of bowel perforation by standard techniques.  Referral will be made for removal by EMR technique.     [x] lipoma noted in ascending colon which demonstrated below side.  Biopsy taken from its surface to exclude dysplasia.    Specimens:   ID Type Source Tests Collected by Time Destination   A : lipoma biopsy surface ascending colon Tissue Tissue SURGICAL PATHOLOGY Romulo Gonzalez MD 7/1/2025 0858    B : Polyp at 100 CM Tissue Colon SURGICAL PATHOLOGY Romulo Gonzalez MD 7/1/2025 0908        Implants:  * No implants in log *      Drains:  * No LDAs found *    Details: The patient was placed in left lateral position on the operating table. After the initiation of intravenous sedatives by the anesthesiologist, digital rectal exam was done, and a flexible pediatric colonoscope of variable stiffness was inserted per rectum and advanced all the way to cecum under direct vision.    The cecum was recognized by the ileocecal valve and appendiceal orifice.    The colonoscope was withdrawn, and the ascending, transverse, descending, and sigmoid segments of the colon as well as the rectum were carefully examined.   The tip of the colonoscope was retroflexed in the

## 2025-07-02 ENCOUNTER — RESULTS FOLLOW-UP (OUTPATIENT)
Dept: SURGERY | Age: 75
End: 2025-07-02

## 2025-07-02 LAB — SURGICAL PATHOLOGY REPORT: NORMAL

## 2025-07-07 ENCOUNTER — PREP FOR PROCEDURE (OUTPATIENT)
Dept: GASTROENTEROLOGY | Age: 75
End: 2025-07-07

## 2025-07-07 ENCOUNTER — TELEPHONE (OUTPATIENT)
Dept: GASTROENTEROLOGY | Age: 75
End: 2025-07-07

## 2025-07-07 PROBLEM — K63.5 COLON POLYP: Status: ACTIVE | Noted: 2025-07-07

## 2025-07-07 NOTE — TELEPHONE ENCOUNTER
Procedure scheduled/Sita  Procedure: ColonEMR  Dx:Colon polyp  Referring: Lisa  Date: 08/19/25  Time: 11:00am/arrive 9:30am  Hospital: [facility]  Bowel Prep: SuTab (per patient request)  Patient advised by phone: phone/mailed bowel prep instructions/patient instructed to get SuTab coupon online.  Clearance: none  GLP-1: none

## 2025-08-15 RX ORDER — LANOLIN ALCOHOL/MO/W.PET/CERES
1000 CREAM (GRAM) TOPICAL NIGHTLY
COMMUNITY

## 2025-08-15 RX ORDER — METFORMIN HYDROCHLORIDE 500 MG/1
500 TABLET, EXTENDED RELEASE ORAL
COMMUNITY
Start: 2025-04-14

## 2025-08-15 RX ORDER — HYDROCHLOROTHIAZIDE 12.5 MG/1
12.5 TABLET ORAL DAILY
COMMUNITY
Start: 2025-04-21

## 2025-08-15 RX ORDER — MECLIZINE HCL 12.5 MG 12.5 MG/1
TABLET ORAL
COMMUNITY
Start: 2025-03-11

## 2025-08-15 RX ORDER — MULTIVITAMIN WITH IRON
500 TABLET ORAL NIGHTLY
COMMUNITY

## 2025-08-18 ENCOUNTER — ANESTHESIA EVENT (OUTPATIENT)
Dept: ENDOSCOPY | Age: 75
End: 2025-08-18
Payer: MEDICARE

## 2025-08-18 ASSESSMENT — ENCOUNTER SYMPTOMS: SHORTNESS OF BREATH: 1

## 2025-08-19 ENCOUNTER — ANESTHESIA (OUTPATIENT)
Dept: ENDOSCOPY | Age: 75
End: 2025-08-19
Payer: MEDICARE

## 2025-08-19 ENCOUNTER — HOSPITAL ENCOUNTER (OUTPATIENT)
Age: 75
Setting detail: OUTPATIENT SURGERY
Discharge: HOME | End: 2025-08-19
Attending: INTERNAL MEDICINE | Admitting: INTERNAL MEDICINE
Payer: MEDICARE

## 2025-08-19 ENCOUNTER — TELEPHONE (OUTPATIENT)
Dept: GASTROENTEROLOGY | Age: 75
End: 2025-08-19

## 2025-08-19 VITALS
OXYGEN SATURATION: 99 % | WEIGHT: 198 LBS | TEMPERATURE: 96.8 F | SYSTOLIC BLOOD PRESSURE: 131 MMHG | HEART RATE: 52 BPM | DIASTOLIC BLOOD PRESSURE: 64 MMHG | RESPIRATION RATE: 22 BRPM | HEIGHT: 63 IN | BODY MASS INDEX: 35.08 KG/M2

## 2025-08-19 DIAGNOSIS — K63.5 COLON POLYP: ICD-10-CM

## 2025-08-19 LAB
BUN BLD-MCNC: 12 MG/DL (ref 8–26)
CA-I BLD-SCNC: 1.21 MMOL/L (ref 1.15–1.33)
CHLORIDE BLD-SCNC: 110 MMOL/L (ref 98–107)
EGFR, POC: >90 ML/MIN/1.73M2
GLUCOSE BLD-MCNC: 101 MG/DL (ref 74–100)
POC CREATININE: 0.6 MG/DL (ref 0.51–1.19)
POTASSIUM BLD-SCNC: 3.7 MMOL/L (ref 3.5–4.5)
SODIUM BLD-SCNC: 149 MMOL/L (ref 138–146)

## 2025-08-19 PROCEDURE — 6360000002 HC RX W HCPCS

## 2025-08-19 PROCEDURE — C1889 IMPLANT/INSERT DEVICE, NOC: HCPCS | Performed by: INTERNAL MEDICINE

## 2025-08-19 PROCEDURE — 82330 ASSAY OF CALCIUM: CPT

## 2025-08-19 PROCEDURE — 82947 ASSAY GLUCOSE BLOOD QUANT: CPT

## 2025-08-19 PROCEDURE — 7100000011 HC PHASE II RECOVERY - ADDTL 15 MIN: Performed by: INTERNAL MEDICINE

## 2025-08-19 PROCEDURE — 84132 ASSAY OF SERUM POTASSIUM: CPT

## 2025-08-19 PROCEDURE — 3700000001 HC ADD 15 MINUTES (ANESTHESIA): Performed by: INTERNAL MEDICINE

## 2025-08-19 PROCEDURE — 7100000010 HC PHASE II RECOVERY - FIRST 15 MIN: Performed by: INTERNAL MEDICINE

## 2025-08-19 PROCEDURE — 82565 ASSAY OF CREATININE: CPT

## 2025-08-19 PROCEDURE — 88305 TISSUE EXAM BY PATHOLOGIST: CPT

## 2025-08-19 PROCEDURE — 2709999900 HC NON-CHARGEABLE SUPPLY: Performed by: INTERNAL MEDICINE

## 2025-08-19 PROCEDURE — 84295 ASSAY OF SERUM SODIUM: CPT

## 2025-08-19 PROCEDURE — 2580000003 HC RX 258

## 2025-08-19 PROCEDURE — 3700000000 HC ANESTHESIA ATTENDED CARE: Performed by: INTERNAL MEDICINE

## 2025-08-19 PROCEDURE — 82435 ASSAY OF BLOOD CHLORIDE: CPT

## 2025-08-19 PROCEDURE — 84520 ASSAY OF UREA NITROGEN: CPT

## 2025-08-19 PROCEDURE — 3609010200 HC COLONOSCOPY ABLATION TUMOR POLYP/OTHER LES: Performed by: INTERNAL MEDICINE

## 2025-08-19 DEVICE — IMPLANTABLE DEVICE: Type: IMPLANTABLE DEVICE | Status: FUNCTIONAL

## 2025-08-19 RX ORDER — LIDOCAINE HYDROCHLORIDE 10 MG/ML
INJECTION, SOLUTION EPIDURAL; INFILTRATION; INTRACAUDAL; PERINEURAL
Status: DISCONTINUED | OUTPATIENT
Start: 2025-08-19 | End: 2025-08-19

## 2025-08-19 RX ORDER — SODIUM CHLORIDE, SODIUM LACTATE, POTASSIUM CHLORIDE, CALCIUM CHLORIDE 600; 310; 30; 20 MG/100ML; MG/100ML; MG/100ML; MG/100ML
INJECTION, SOLUTION INTRAVENOUS
Status: DISCONTINUED | OUTPATIENT
Start: 2025-08-19 | End: 2025-08-19 | Stop reason: SDUPTHER

## 2025-08-19 RX ORDER — PROPOFOL 10 MG/ML
INJECTION, EMULSION INTRAVENOUS
Status: DISCONTINUED | OUTPATIENT
Start: 2025-08-19 | End: 2025-08-19 | Stop reason: SDUPTHER

## 2025-08-19 RX ADMIN — PROPOFOL 50 MG: 10 INJECTION, EMULSION INTRAVENOUS at 12:49

## 2025-08-19 RX ADMIN — PROPOFOL 125 MCG/KG/MIN: 10 INJECTION, EMULSION INTRAVENOUS at 12:42

## 2025-08-19 RX ADMIN — SODIUM CHLORIDE, POTASSIUM CHLORIDE, SODIUM LACTATE AND CALCIUM CHLORIDE: 600; 310; 30; 20 INJECTION, SOLUTION INTRAVENOUS at 12:39

## 2025-08-19 ASSESSMENT — PAIN SCALES - GENERAL
PAINLEVEL_OUTOF10: 0

## 2025-08-21 LAB — SURGICAL PATHOLOGY REPORT: NORMAL

## (undated) DEVICE — ADAPTER CLEANING PORPOISE CLEANING

## (undated) DEVICE — FORCEPS BX 240CM 2.4MM L NDL RAD JAW 4 M00513334

## (undated) DEVICE — MARKER ENDOSCP TISS TATTOO C BLK SUSP PREFIL PREASSEMBLED

## (undated) DEVICE — Device

## (undated) DEVICE — SNARE ENDOSCP L240CM OD24MM LOOP W15MM RND INSUL STIFF BRAID

## (undated) DEVICE — TRAP SPEC RETRV CLR PLAS POLYP IN LN SUCT QUIK CTCH

## (undated) DEVICE — PERRYSBURG ENDO PACK: Brand: MEDLINE INDUSTRIES, INC.

## (undated) DEVICE — ELECTRODE PT RET AD L9FT HI MOIST COND ADH HYDRGEL CORDED

## (undated) DEVICE — ELEVIEW SUBMUCOSAL INJECTABLE COMPOSITION 10ML

## (undated) DEVICE — SINGLE-USE POLYPECTOMY SNARE: Brand: CAPTIFLEX

## (undated) DEVICE — NEEDLE SCLERO 25GA L240CM OD0.51MM ID0.24MM EXTN L4MM SHTH

## (undated) DEVICE — POLYP TRAP: Brand: TRAPEASE®

## (undated) DEVICE — Device: Brand: DEFENDO VALVE AND CONNECTOR KIT